# Patient Record
Sex: FEMALE | Race: WHITE | NOT HISPANIC OR LATINO
[De-identification: names, ages, dates, MRNs, and addresses within clinical notes are randomized per-mention and may not be internally consistent; named-entity substitution may affect disease eponyms.]

---

## 2023-05-16 PROBLEM — Z00.00 ENCOUNTER FOR PREVENTIVE HEALTH EXAMINATION: Status: ACTIVE | Noted: 2023-05-16

## 2023-05-17 ENCOUNTER — APPOINTMENT (OUTPATIENT)
Dept: PHYSICAL MEDICINE AND REHAB | Facility: CLINIC | Age: 33
End: 2023-05-17
Payer: COMMERCIAL

## 2023-05-17 VITALS
SYSTOLIC BLOOD PRESSURE: 107 MMHG | HEIGHT: 67 IN | BODY MASS INDEX: 28.25 KG/M2 | RESPIRATION RATE: 18 BRPM | WEIGHT: 180 LBS | DIASTOLIC BLOOD PRESSURE: 82 MMHG | HEART RATE: 75 BPM

## 2023-05-17 DIAGNOSIS — Z87.39 PERSONAL HISTORY OF OTHER DISEASES OF THE MUSCULOSKELETAL SYSTEM AND CONNECTIVE TISSUE: ICD-10-CM

## 2023-05-17 DIAGNOSIS — R52 PAIN, UNSPECIFIED: ICD-10-CM

## 2023-05-17 DIAGNOSIS — R29.898 OTHER SYMPTOMS AND SIGNS INVOLVING THE MUSCULOSKELETAL SYSTEM: ICD-10-CM

## 2023-05-17 DIAGNOSIS — Z78.9 OTHER SPECIFIED HEALTH STATUS: ICD-10-CM

## 2023-05-17 DIAGNOSIS — R05.9 PAIN, UNSPECIFIED: ICD-10-CM

## 2023-05-17 DIAGNOSIS — Z86.39 PERSONAL HISTORY OF OTHER ENDOCRINE, NUTRITIONAL AND METABOLIC DISEASE: ICD-10-CM

## 2023-05-17 DIAGNOSIS — M32.9 SYSTEMIC LUPUS ERYTHEMATOSUS, UNSPECIFIED: ICD-10-CM

## 2023-05-17 PROCEDURE — 99204 OFFICE O/P NEW MOD 45 MIN: CPT

## 2023-05-17 RX ORDER — LEVOTHYROXINE SODIUM 137 UG/1
TABLET ORAL
Refills: 0 | Status: ACTIVE | COMMUNITY

## 2023-05-17 RX ORDER — METHYLPREDNISOLONE 4 MG/1
4 TABLET ORAL
Qty: 1 | Refills: 1 | Status: ACTIVE | COMMUNITY
Start: 2023-05-17 | End: 1900-01-01

## 2023-05-17 RX ORDER — GABAPENTIN 100 MG/1
100 CAPSULE ORAL 3 TIMES DAILY
Qty: 90 | Refills: 0 | Status: ACTIVE | COMMUNITY
Start: 2023-05-17 | End: 1900-01-01

## 2023-05-17 RX ORDER — IBUPROFEN 200 MG/1
TABLET, FILM COATED ORAL
Refills: 0 | Status: ACTIVE | COMMUNITY

## 2023-05-17 RX ORDER — ESCITALOPRAM OXALATE 5 MG/1
TABLET, FILM COATED ORAL
Refills: 0 | Status: ACTIVE | COMMUNITY

## 2023-05-17 NOTE — PHYSICAL EXAM
[FreeTextEntry1] : PHYSICAL EXAM : OBJECTIVE \par \par GENERAL : Awake ,alert and oriented to time place and person \par HEAD : normocephalic and atraumatic \par NECK : supple ,no tracheal deviation ,no thyroid enlargement noted with swallowing\par EYES : sclera and conjunctiva normal no redness,intact extraocular movements \par ENT  : ears and nose normal in appearance -hearing adequate \par PULMONARY: effort normal. No respiratory distress. breathing regular. No wheezes \par LYMPH : No swelling in limbs, capillary return within normal range \par CVS : warm extremities,no palpitations,not short of breath, no visible jugular venous distention\par PSYCH : mood and affect normal ,good eye contact ,normal attention \par ABDOMEN : no visible distension , \par NEUROLOGICAL:cranial nerves intact,muscle tone normal,gait and balance safe except where noted below \par SKIN : warm and dry No rash detected over specific body areas examined \par MUSCULOSKELETAL: normal muscle bulk, no focal bony tenderness /posture normal except where specified below\par spasm \par pain with FF 30 > ext 30 \par SLR + L and R at 30 \par No long tract signs found on clinical exam and no focal neurological deficits\par gait NL \par heel toe fine \par sensation intact

## 2023-05-17 NOTE — REVIEW OF SYSTEMS
[Fever] : no fever [Lower Ext Edema] : no lower extremity edema [Joint Pain] : joint pain [Muscle Pain] : muscle pain [Difficulty Walking] : difficulty walking [Negative] : Heme/Lymph

## 2023-05-17 NOTE — ASSESSMENT
[FreeTextEntry1] : \par PLAN AND RECOMMENDATIONS :\par \par We discussed differential diagnosis and clinical impression\par To help the patient understand their condition a plastic 3D model of spine  was used to better explain.\par \par advise xrays instability as well as PT and gabapentin 100 TID and medrol dose juan \par \par Recommend\par .symptomatic care and support\par  medications as above -discussed side effects arpan drowiness in am \par \par  imaging as above \par  referral to [facility] \par  hydrotherapy /heat / cold for pain\par  continue  spinal precautions including care with bending, lifting, twisting and  carrying.\par \par  relative rest and avoidance of painful activity where possible \par  increasing activity as discussed \par  return for follow up 4 weeks \par if no better MR \par unusual nino sicatica !\par \par The patient had the opportunity to ask questions and all were answered to their satisfaction. We will coordinate treatment with the other members of the treatment team.\par The patient verbalized understanding of the management/plan rationale and agreed with my recommendations.\par time spent 45 mins \par

## 2023-05-17 NOTE — HISTORY OF PRESENT ILLNESS
[FreeTextEntry1] : Ms. NESTOR OJEDA is a very pleasant 32 year female seen for evaluation of severe sudden  low back pain radiating to the bilateral  lower extremity that has been ongoing for 12 days   without any specific injury or inciting event. \par she has had episodic LBP over the years and a few episodes last year never as bad as this The pain is located primarily lower back  intermittent in nature and described as sharp stabbing shooting  . The pain is rated as 5/10 during today's visit, and ranges from 2-10/10. The patient's symptoms are aggravated by sitting getting up and stairs   and alleviated by laying down standing  . The patient denies any numbness/tingling, weakness, or bowel/bladder dysfunction. The patient has  other complaints at this time now knee is starting to hurt \par initially the pain was 10 and she could not move twist wipe herself or laugh she was bedbound\par now 60 % improved but pain with activity or sitting arpan as her partners desk is too high for her \par she works as a  \par she has autoimmune PMH LUPUS \par .\par

## 2023-08-31 RX ORDER — GABAPENTIN 300 MG/1
300 CAPSULE ORAL
Qty: 60 | Refills: 1 | Status: ACTIVE | COMMUNITY
Start: 2023-08-31 | End: 1900-01-01

## 2023-09-11 ENCOUNTER — APPOINTMENT (OUTPATIENT)
Dept: PHYSICAL MEDICINE AND REHAB | Facility: CLINIC | Age: 33
End: 2023-09-11

## 2024-01-08 ENCOUNTER — APPOINTMENT (OUTPATIENT)
Dept: PHYSICAL MEDICINE AND REHAB | Facility: CLINIC | Age: 34
End: 2024-01-08
Payer: COMMERCIAL

## 2024-01-08 VITALS
RESPIRATION RATE: 18 BRPM | BODY MASS INDEX: 28.25 KG/M2 | HEART RATE: 80 BPM | DIASTOLIC BLOOD PRESSURE: 67 MMHG | WEIGHT: 180 LBS | HEIGHT: 67 IN | SYSTOLIC BLOOD PRESSURE: 104 MMHG

## 2024-01-08 DIAGNOSIS — R52 PAIN, UNSPECIFIED: ICD-10-CM

## 2024-01-08 PROCEDURE — 99214 OFFICE O/P EST MOD 30 MIN: CPT

## 2024-01-08 RX ORDER — MELOXICAM 15 MG/1
15 TABLET ORAL
Qty: 30 | Refills: 4 | Status: ACTIVE | COMMUNITY
Start: 2024-01-08 | End: 1900-01-01

## 2024-01-26 ENCOUNTER — APPOINTMENT (OUTPATIENT)
Dept: MRI IMAGING | Facility: HOSPITAL | Age: 34
End: 2024-01-26
Payer: COMMERCIAL

## 2024-01-26 ENCOUNTER — OUTPATIENT (OUTPATIENT)
Dept: OUTPATIENT SERVICES | Facility: HOSPITAL | Age: 34
LOS: 1 days | End: 2024-01-26
Payer: COMMERCIAL

## 2024-01-26 PROCEDURE — 72148 MRI LUMBAR SPINE W/O DYE: CPT | Mod: 26

## 2024-01-26 PROCEDURE — 72148 MRI LUMBAR SPINE W/O DYE: CPT

## 2024-02-06 ENCOUNTER — APPOINTMENT (OUTPATIENT)
Dept: PHYSICAL MEDICINE AND REHAB | Facility: CLINIC | Age: 34
End: 2024-02-06
Payer: COMMERCIAL

## 2024-02-06 VITALS — HEIGHT: 67 IN | BODY MASS INDEX: 28.25 KG/M2 | WEIGHT: 180 LBS

## 2024-02-06 DIAGNOSIS — R52 PAIN, UNSPECIFIED: ICD-10-CM

## 2024-02-06 DIAGNOSIS — M54.30 DORSALGIA, UNSPECIFIED: ICD-10-CM

## 2024-02-06 DIAGNOSIS — M54.9 DORSALGIA, UNSPECIFIED: ICD-10-CM

## 2024-02-06 PROCEDURE — 99214 OFFICE O/P EST MOD 30 MIN: CPT | Mod: 95

## 2024-02-06 NOTE — HISTORY OF PRESENT ILLNESS
[FreeTextEntry1] : This visit was provided via telehealth using real-time 2-way audio visual technology. The patient, Ms. NESTOR OJEDA, was located at home, [address] [place] [id number] at the time of the visit. The provider, MACIE THORPE, was located at the medical office located in [address] [place] [address] at the time of the visit. The patient, NESTOR OJEDA and Provider participated in the telehealth encounter. Verbal consent given on 02/06/2024, by the patient.    Ms. NESTOR OJEDA is a very pleasant 32-year female being worked up for  severe ongoing low back pain radiating to the right lower extremity that has been ongoing for 1 year without any specific injury or inciting event .pain is a little better today   She has had episodic LBP over the years and a few episodes last year never as bad as last time The pain is located primarily lower back intermittent in nature and described as sharp stabbing shooting. The pain is rated as 5/10 during today's visit, and ranges from 2-10/10. The patient's symptoms are aggravated by sitting getting up and stairs and alleviated by laying down standing and walking. The patient denies any numbness/tingling, weakness, or bowel/bladder dysfunction. The patient has other complaints at this time now knee is starting to hurt. she had few sessions of PT to no avail  initially the pain was 10 and she could not move twist wipe herself or laugh she was bedbound. she had improvement 60% but pain but pain came back, one day she woke up with severe pain again, right now is more tolerable.   she works as a . she cannot straighten up her body leans to R  she has autoimmune PMH LUPUS she had PT - on October 23 but her  lost  work and she didn't have insurance and she stopped attending, right now she has health insurance. Pt helped 50 % . She was in [place] to visit her parents on December 2023, and she sawa  Doctor in [place], she prescribed her some medication - Nimesulide -  but doesn't help.  She has the feeling that she cannot  stand up  straight, her spine is twisted  xrays show T spine scoliosis convex to left  MR shows central discs only L4-5 and L5-s1 no effacement of cord

## 2024-02-06 NOTE — ASSESSMENT
[FreeTextEntry1] :  PLAN AND RECOMMENDATIONS :  We discussed imaging  diagnosis and clinical impression will email the results to her  advise EMG rule oout chemical radiculopathy  also advise labs rule out sero + arthritis etc    Recommend .symptomatic care and support  medications cont meds   imaging done inconclusive     hydrotherapy /heat / cold for pain  continue  spinal precautions including care with bending, lifting, twisting and  carrying.  relative rest and avoidance of painful activity where possible   increasing activity as discussed   return for EMG  Information given to patient about EMG and Nerve Conduction Study Examination including  planning, differential diagnosis to rule in /rule out ,duration of the test ,precautions (if patient on blood thinner.has bleeding disorder or  pace maker device etc -still possible to undergo with care), side effects(benign-limited to short term bruising and discomfort/pain)   The protocol of temp checks upon arrival ,disinfection procedure of waiting room and the lab explained- reassured.  All questions answered.  Patient instructed to book appointment upon conclusion of appointment  Information sheet ' Answers to your Questions on EMG " forwarded to patient to read prior to testing, with further information about training,background and the procedure itself .

## 2024-02-06 NOTE — PHYSICAL EXAM
[Normal] : Oriented to person, place, and time, insight and judgement were intact and the affect was normal [de-identified] : painful

## 2024-02-06 NOTE — DATA REVIEWED
[Plain X-Rays] : plain X-Rays [MRI] : MRI [FreeTextEntry1] :  L4-L5: Central disc herniation which effaces the anterior thecal sac without nerve root displacement. L5-S1: Very small central disc protrusion without nerve root displacement.

## 2024-02-12 ENCOUNTER — APPOINTMENT (OUTPATIENT)
Dept: PHYSICAL MEDICINE AND REHAB | Facility: CLINIC | Age: 34
End: 2024-02-12
Payer: COMMERCIAL

## 2024-02-12 DIAGNOSIS — M32.9 SYSTEMIC LUPUS ERYTHEMATOSUS, UNSPECIFIED: ICD-10-CM

## 2024-02-12 DIAGNOSIS — M54.9 DORSALGIA, UNSPECIFIED: ICD-10-CM

## 2024-02-12 DIAGNOSIS — G89.29 LOW BACK PAIN, UNSPECIFIED: ICD-10-CM

## 2024-02-12 DIAGNOSIS — M54.17 RADICULOPATHY, LUMBOSACRAL REGION: ICD-10-CM

## 2024-02-12 DIAGNOSIS — M62.830 MUSCLE SPASM OF BACK: ICD-10-CM

## 2024-02-12 DIAGNOSIS — M54.50 LOW BACK PAIN, UNSPECIFIED: ICD-10-CM

## 2024-02-12 PROCEDURE — 95886 MUSC TEST DONE W/N TEST COMP: CPT

## 2024-02-12 PROCEDURE — 95911 NRV CNDJ TEST 9-10 STUDIES: CPT

## 2024-02-12 NOTE — PROCEDURE
[de-identified] : EMG /NERVE CONDUCTION STUDY performed today without complication  Tabulated data, wave forms , conclusions and recommendations are attached and in the procedure report.  Please refer to the scanned study attached to this encounter  Full history and focused clinical exam performed prior to the examination and documented in report

## 2024-02-20 ENCOUNTER — APPOINTMENT (OUTPATIENT)
Dept: PHYSICAL MEDICINE AND REHAB | Facility: CLINIC | Age: 34
End: 2024-02-20
Payer: COMMERCIAL

## 2024-02-20 VITALS — WEIGHT: 180 LBS | BODY MASS INDEX: 28.25 KG/M2 | HEIGHT: 67 IN

## 2024-02-20 DIAGNOSIS — M54.41 LUMBAGO WITH SCIATICA, LEFT SIDE: ICD-10-CM

## 2024-02-20 DIAGNOSIS — G93.32 MYALGIC ENCEPHALOMYELITIS/CHRONIC FATIGUE SYNDROME: ICD-10-CM

## 2024-02-20 DIAGNOSIS — M54.42 LUMBAGO WITH SCIATICA, LEFT SIDE: ICD-10-CM

## 2024-02-20 DIAGNOSIS — R52 PAIN, UNSPECIFIED: ICD-10-CM

## 2024-02-20 DIAGNOSIS — R53.81 CHRONIC FATIGUE, UNSPECIFIED: ICD-10-CM

## 2024-02-20 DIAGNOSIS — G89.29 LUMBAGO WITH SCIATICA, LEFT SIDE: ICD-10-CM

## 2024-02-20 DIAGNOSIS — R53.82 CHRONIC FATIGUE, UNSPECIFIED: ICD-10-CM

## 2024-02-20 PROCEDURE — 99214 OFFICE O/P EST MOD 30 MIN: CPT | Mod: 95

## 2024-02-20 NOTE — ASSESSMENT
[FreeTextEntry1] :   PLAN AND RECOMMENDATIONS :   We discussed differential diagnosis and clinical impression  discussed EMG and MR  reassured but she is frustrated that no answers  this may be fibromyalgia.  or sero neg arthritis  ?  chronic fatigue fits with this   Recommend .symptomatic care and support  medications as per rheum   imaging done   referral to rheum -to run new tests  she has appt thru [facility]   hydrotherapy /heat / cold for pain  continue  spinal precautions including care with bending, lifting, twisting and  carrying.  relative rest and avoidance of painful activity where possible  increasing activity as discussed  return for follow up 2 months  The patient had the opportunity to ask questions and all were answered to their satisfaction. We will coordinate treatment with the other members of the treatment team. The patient verbalized understanding of the management/plan rationale and agreed with my recommendations. time spent 30 mins issues with connections  via teams and [employer] -phone no by [facility] intake is wrong in chart  many phone calls spent on connecting

## 2024-02-20 NOTE — PHYSICAL EXAM
[Normal] : Oriented to person, place, and time, insight and judgement were intact and the affect was normal [FreeTextEntry1] : PHYSICAL EXAM : OBJECTIVE   GENERAL : Awake ,alert and oriented to time place and person  HEAD : normocephalic and atraumatic  NECK : supple ,no tracheal deviation ,no thyroid enlargement noted with swallowing EYES : sclera and conjunctiva normal no redness,intact extraocular movements  ENT  : ears and nose normal in appearance -hearing adequate  PULMONARY: effort normal. No respiratory distress. breathing regular. No wheezes  LYMPH : No swelling in limbs, capillary return within normal range  CVS : warm extremities,no palpitations,not short of breath, no visible jugular venous distention PSYCH : mood and affect normal ,good eye contact ,normal attention  ABDOMEN : no visible distension ,  NEUROLOGICAL:cranial nerves intact,muscle tone normal,gait and balance safe except where noted below  SKIN : warm and dry No rash detected over specific body areas examined  MUSCULOSKELETAL: normal muscle bulk, no focal bony tenderness /posture normal except where specified below

## 2024-02-20 NOTE — HISTORY OF PRESENT ILLNESS
[FreeTextEntry1] : This visit was provided via telehealth using real-time 2-way audio visual technology. The patient, Ms. NESTOR OJEDA , was located at home, [address] [place] [address] , at the time of the visit. The provider, MACIE THORPE, was located at the medical office located in [address] [place] [address] at the time of the visit. The patient, NESTOR OJEDA and Provider participated in the telehealth encounter. Verbal consent given on 02/20/2024 , by the patient NESTOR OJEDA .  Ms. NESTOR OJEDA is a very pleasant 32-year female being worked up for severe ongoing low back pain radiating to the right lower extremity that has been ongoing for 1 year without any specific injury or inciting event pain is a little better today. the pain is unusual as she has tremendous night pain and cannot sleep thru night leaving her exhausted  her EMG was negative  MR not diagnostic  she has some lupus markers but not clear SLE She has had episodic LBP over the years and a few episodes last year never as bad as last time The pain is located primarily lower back intermittent in nature and described as sharp stabbing shooting. The pain is rated as 5/10 during today's visit, and ranges from 2-10/10. The patient's symptoms are aggravated by sitting getting up and stairs and alleviated by laying down standing and walking. The patient denies any numbness/tingling, weakness, or bowel/bladder dysfunction. The patient has other complaints at this time now knee is starting to hurt. she had 20 sessions to no avail .  initially the pain was 10 and she could not move twist wipe herself or laugh she was bedbound. she had improvement 60% but pain but pain came back, one day she woke up with severe pain again, right now is more tolerable.  she works as a . she cannot straighten up her body leans to R she has autoimmune PMH LUPUS but sed rate not elevated she had PT - on October 23 but her  lost work and she didn't have insurance and she stopped attending, right now she has health insurance. Pt helped 50 % . She was in [place] to visit her parents on December 2023, and she sawa Doctor in [place], she prescribed her some medication - Nimesulide - but doesn't help. She has the feeling that she cannot stand up straight, her spine is twisted xrays show T spine scoliosis convex to left MR shows central discs only L4-5 and L5-s1 no effacement of cord she has appt for new rheumatologist march 7  gabapentin is helping her pain now  lower back pain wakes her up cannot turn over in bed without being awoken  she tried daily plaquinil -no help in past  she has gained weight and now unfit  scared to exercise as it hurts also  she asks about pilates and swimming -I think this is an excellent idea

## 2024-02-20 NOTE — REVIEW OF SYSTEMS
[Patient Intake Form Reviewed] : Patient intake form was reviewed [Fever] : no fever [Joint Pain] : joint pain [Muscle Pain] : muscle pain [Muscle Weakness] : no muscle weakness [Difficulty Walking] : no difficulty walking